# Patient Record
Sex: FEMALE | Race: WHITE | NOT HISPANIC OR LATINO | URBAN - METROPOLITAN AREA
[De-identification: names, ages, dates, MRNs, and addresses within clinical notes are randomized per-mention and may not be internally consistent; named-entity substitution may affect disease eponyms.]

---

## 2019-02-08 ENCOUNTER — HOSPITAL ENCOUNTER (OUTPATIENT)
Dept: URGENT CARE | Facility: CLINIC | Age: 24
Discharge: HOME | End: 2019-02-08
Attending: PHYSICIAN ASSISTANT

## 2019-02-10 LAB — BACTERIA SPEC AEROBE CULT: ABNORMAL

## 2019-04-25 ENCOUNTER — HOSPITAL ENCOUNTER (OUTPATIENT)
Dept: URGENT CARE | Facility: CLINIC | Age: 24
Discharge: HOME | End: 2019-04-25

## 2019-04-27 LAB — BACTERIA SPEC AEROBE CULT: NORMAL

## 2019-11-14 ENCOUNTER — HOSPITAL ENCOUNTER (OUTPATIENT)
Dept: URGENT CARE | Facility: CLINIC | Age: 24
Discharge: HOME | End: 2019-11-14

## 2020-04-05 ENCOUNTER — HOSPITAL ENCOUNTER (OUTPATIENT)
Dept: URGENT CARE | Facility: CLINIC | Age: 25
Discharge: HOME | End: 2020-04-05

## 2020-04-07 LAB — BACTERIA SPEC AEROBE CULT: NORMAL

## 2020-06-15 ENCOUNTER — HOSPITAL ENCOUNTER (OUTPATIENT)
Dept: URGENT CARE | Facility: CLINIC | Age: 25
Discharge: HOME | End: 2020-06-15

## 2020-07-06 ENCOUNTER — OFFICE VISIT CONVERTED (OUTPATIENT)
Dept: PLASTIC SURGERY | Facility: CLINIC | Age: 25
End: 2020-07-06
Attending: PLASTIC SURGERY

## 2020-08-11 ENCOUNTER — HOSPITAL ENCOUNTER (OUTPATIENT)
Dept: MAMMOGRAPHY | Facility: HOSPITAL | Age: 25
Discharge: HOME | End: 2020-08-11
Attending: PLASTIC SURGERY

## 2020-08-11 LAB — NICOTINE UR-MCNC: NEGATIVE NG/ML

## 2020-08-24 ENCOUNTER — OFFICE VISIT CONVERTED (OUTPATIENT)
Dept: PLASTIC SURGERY | Facility: CLINIC | Age: 25
End: 2020-08-24
Attending: PLASTIC SURGERY

## 2020-10-02 ENCOUNTER — HOSPITAL ENCOUNTER (OUTPATIENT)
Dept: URGENT CARE | Facility: CLINIC | Age: 25
Discharge: HOME | End: 2020-10-02

## 2020-10-15 ENCOUNTER — HOSPITAL ENCOUNTER (OUTPATIENT)
Dept: PREADMISSION TESTING | Facility: HOSPITAL | Age: 25
Discharge: HOME | End: 2020-10-15
Attending: PLASTIC SURGERY

## 2020-10-17 LAB — SARS-COV-2 RNA SPEC QL NAA+PROBE: NOT DETECTED

## 2020-10-20 ENCOUNTER — HOSPITAL ENCOUNTER (OUTPATIENT)
Dept: PERIOP | Facility: HOSPITAL | Age: 25
Setting detail: HOSPITAL OUTPATIENT SURGERY
Discharge: HOME | End: 2020-10-20
Attending: PLASTIC SURGERY

## 2020-10-20 LAB
HCG UR QL: NEGATIVE
NICOTINE UR-MCNC: NEGATIVE NG/ML

## 2020-10-23 ENCOUNTER — OFFICE VISIT CONVERTED (OUTPATIENT)
Dept: PLASTIC SURGERY | Facility: CLINIC | Age: 25
End: 2020-10-23
Attending: PLASTIC SURGERY

## 2020-10-30 ENCOUNTER — OFFICE VISIT CONVERTED (OUTPATIENT)
Dept: PLASTIC SURGERY | Facility: CLINIC | Age: 25
End: 2020-10-30
Attending: PLASTIC SURGERY

## 2020-10-30 ENCOUNTER — CONVERSION ENCOUNTER (OUTPATIENT)
Dept: PLASTIC SURGERY | Facility: CLINIC | Age: 25
End: 2020-10-30

## 2020-11-13 ENCOUNTER — CONVERSION ENCOUNTER (OUTPATIENT)
Dept: PLASTIC SURGERY | Facility: CLINIC | Age: 25
End: 2020-11-13

## 2020-11-13 ENCOUNTER — OFFICE VISIT CONVERTED (OUTPATIENT)
Dept: PLASTIC SURGERY | Facility: CLINIC | Age: 25
End: 2020-11-13
Attending: PLASTIC SURGERY

## 2020-11-23 ENCOUNTER — CONVERSION ENCOUNTER (OUTPATIENT)
Dept: PLASTIC SURGERY | Facility: CLINIC | Age: 25
End: 2020-11-23

## 2020-11-23 ENCOUNTER — OFFICE VISIT CONVERTED (OUTPATIENT)
Dept: PLASTIC SURGERY | Facility: CLINIC | Age: 25
End: 2020-11-23
Attending: NURSE PRACTITIONER

## 2021-01-14 ENCOUNTER — OFFICE VISIT CONVERTED (OUTPATIENT)
Dept: PLASTIC SURGERY | Facility: CLINIC | Age: 26
End: 2021-01-14
Attending: PLASTIC SURGERY

## 2021-01-14 ENCOUNTER — CONVERSION ENCOUNTER (OUTPATIENT)
Dept: PLASTIC SURGERY | Facility: CLINIC | Age: 26
End: 2021-01-14

## 2021-04-22 ENCOUNTER — HOSPITAL ENCOUNTER (OUTPATIENT)
Dept: MAMMOGRAPHY | Facility: HOSPITAL | Age: 26
Discharge: HOME | End: 2021-04-22
Attending: PLASTIC SURGERY

## 2021-05-07 ENCOUNTER — OFFICE VISIT CONVERTED (OUTPATIENT)
Dept: PLASTIC SURGERY | Facility: CLINIC | Age: 26
End: 2021-05-07
Attending: NURSE PRACTITIONER

## 2021-05-10 NOTE — H&P
"   History and Physical      Patient Name: Raissa Bertrand   Patient ID: [id number]   Sex: Female   YOB: 1995        Visit Date: July 6, 2020    Provider: Helen Frances MD   Location: [facility]   [facility]  [place], [place]  [id number]   Location Phone: [phone]          Chief Complaint  · \"My breasts are too big\"  · \"I'm having shoulder and back pain\"  · \"My bra straps are cutting into my shoulders\"  · \"I have rashes under my breasts\"  · \"I can't exercise because of my breasts\"      History Of Present Illness  Raissa Bertrand is a 24 year old /White female who presents to the office today as a consult from Lyndsey DAVIS.   Raissa Bertrand is a 24 year old /White female who presents to the office today as a consult from Lyndsey DAVIS and would like to discuss breast reduction. Current bra size 38 Triple D , would like to be a full C cup/small D cup. No personal history and/or no positive family (who) have a history of breast cancer. Neck, shoulders, and upper back pain present for ten years. Conservative treatments of ibuprofen and massage , with little or temporary relief. Experiences rashes, treats with baby powder. Can't sleep on back, can't breathe. Trouble exercising , cannot do ACTIVITIES that she would like to do, generally has to wear many sports bras      Patient wears multiple sports bras when exercising. Patient works at [employer], and her breast size interferes with work. Patient does have one child, and has a history of breastfeeding. Patient does not plan to have anymore children. Patient would like her breasts to be smaller and more proportionate. Patient smokes 1/2 PPD.       Past Medical History  Anemia; Macromastia         Past Surgical History  *Denies any surgical procedures         Medication List  Mirena 20 mcg/24 hours (5 yrs) 52 mg intrauterine intrauterine device " "        Allergy List  NO KNOWN DRUG ALLERGIES       Allergies Reconciled  Family Medical History  *No Known Family History         Social History  Tobacco (Current every day)         Review of Systems  · Cardiovascular  o Denies  o : chest pain, lower extremity edema, Heart Attack, Abnormal EKG  · Respiratory  o Denies  o : shortness of breath, wheezing, cough  · Neurologic  o Denies  o : muscular weakness, incoordination, tingling or numbness, headaches  · Psychiatric  o Denies  o : anxiety, depression, difficulty sleeping      Vitals  Date Time BP Position Site L\R Cuff Size HR RR TEMP (F) WT  HT  BMI kg/m2 BSA m2 O2 Sat        07/06/2020 09:37 /71 Sitting    73 - R  97.6 177lbs 6oz 5'  4.5\" 29.98 1.91 95 %          Physical Examination  · Constitutional  o Appearance  o : well developed, well-nourished, Alert and oriented X3  · Eyes  o Sclerae  o : sclerae white  · Respiratory  o Respiratory Effort  o : breathing unlabored   · Cardiovascular  o Heart  o : regular rate  · Breasts  o FEMALE BREAST EXAM  o :   § Masses  § : no masses  § Nipple Discharge  § : no nipple discharge  § Axillary Lymphadenopathy  § : no axillary lymphadenopathy  § SN-N (Right Breast)  § : 35  § SN-N (Left Breast)  § : 36  § SN-IMF (Right Breast)  § : 26  § SN-IMF (Left Breast)  § : 26  § N-IMF stretch (Right Breast)  § : 17  § N-IMF stretch (Left Breast)  § : 16  · Gastrointestinal  o Abdominal Examination  o : abdomen nontender to palpation  · Lymphatic  o Axilla  o : No lymphadenopathy present  · Skin and Subcutaneous Tissue  o General Inspection  o : no lesions present, no areas of discoloration, skin turgor normal, texture normal  · Psychiatric  o Judgement and Insight  o : judgment and insight intact  o Mood and Affect  o : mood normal, affect appropriate  · Schnur Scale  o Schnur Scale Score  o : 527  · BSA  o BSA  o : 1.91     redness and moisture under both breasts with chronic hyperpigmentation, left breast larger than " right breast, significant shoulder grooving bilaterally               Assessment  · Tobacco abuse     305.1/Z72.0  · Macromastia       Hypertrophy of breast     611.1/N62    Problems Reconciled  Plan  · Orders  o Smoking cessation counseling, 3-10 minutes [facility] (73661) - 305.1/Z72.0 - 07/06/2020  o Plastics reconstructive visit (PSREC) - - 07/06/2020  o Cotinine screen urine (done at [facility]) (33866) - 305.1/Z72.0 - 07/06/2020  o Mammogram breast screening 2D digital bilateral. (18723, ) - 611.1/N62 - 07/06/2020   Baseline mammogram prior to breast reduction surgery.   · Medications  o Medications have been Reconciled  o Transition of Care or Provider Policy  · Instructions  o Tobacco and smoking cessation counseling for more than 3 minutes was completed. I counseled the patient to stop smoking before surgery in order to avoid complications such as wound healing problems and infection. We discussed smoking cessation strategies. I counseled the patient to be nicotine free because the nicotine is what causes vasoconstriction of the blood vessels.  o The patient was given literature in regards to the procedure and encouraged to visit the websites of ASPS and ASAPS.  o We will have the patient undergo a pre-operative mammogram.  o Greater than 45 min of time was spent directly with the pt in counseling & coordination of care.  o We discussed the procedure for bilateral breast reduction under general anesthesia as well as the postoperative recovery time and the need for limitation of activities. The risks of surgery were discussed including bleeding, infection, scarring (for which diagrams were drawn), pain, poor healing, loss of skin and or nipple, change in nipple sensation, inability to breast feed, asymmetry, no guarantee of postoperative cup size.  o I think that this patient is an excellent candidate for a breast reduction. I feel that this procedure is medically necessary to relieve her symptoms. I would anticipate  resecting at least 527 grams of breast tissue from each breast. Photographs were taken. Instructed pt to call and schedule pre-op when Cotinine is negative. This patient has my office number to call with any further questions.  o Greater than 45 min of time was spent directly with the pt in counseling & coordination of care.  o Electronically Identified Patient Education Materials Provided Electronically            Electronically Signed by: Helen Frnaces MD -Author on July 6, 2020 02:34:45 PM

## 2021-05-13 NOTE — PROGRESS NOTES
Progress Note      Patient Name: Raissa Bertrand   Patient ID: [id number]   Sex: Female   YOB: 1995    Referring Provider: Lyndsey DAVIS    Visit Date: October 23, 2020    Provider: Helen Frances MD   Location: [facility]   Location Address: [address] [place] [address]  [id number]   Location Phone: [phone]          Chief Complaint  · Follow Up Visit, Breast Reduction      History Of Present Illness  Raissa Betrrand is a 24 year old /White female who presents to the office today as a consult from Lyndsey DAVIS.   Raissa Bertrand is a 24 year old /White female who presents to the office today for a follow up visit status post breast reduction on 10/20/2020. She is doing very well.   Pain Rating on the Martin's Scale is: 3/10       Past Medical History  Anemia; Macromastia         Past Surgical History  *Denies any surgical procedures         Medication List  Chantix 1 mg oral tablet; Diflucan 150 mg oral tablet; Mirena 20 mcg/24 hours (5 yrs) 52 mg intrauterine intrauterine device         Allergy List  NO KNOWN DRUG ALLERGIES         Family Medical History  *No Known Family History         Social History  Tobacco (Former)         Review of Systems  · Cardiovascular  o Denies  o : chest pain, lower extremity edema, Heart Attack, Abnormal EKG  · Respiratory  o Denies  o : shortness of breath, wheezing, cough  · Neurologic  o Denies  o : muscular weakness, incoordination, tingling or numbness, headaches  · Psychiatric  o Denies  o : anxiety, depression, difficulty sleeping      Vitals  Date Time BP Position Site L\R Cuff Size HR RR TEMP (F) WT  HT  BMI kg/m2 BSA m2 O2 Sat FR L/min FiO2        10/23/2020 09:37 /79 Sitting    105 - R  97.7     98 %  21%          Physical Examination  · Constitutional  o Appearance  o : well developed, well-nourished, Alert and oriented X3  · Respiratory  o Respiratory Effort  o :  breathing unlabored   · Cardiovascular  o Heart  o : regular rate  · Skin and Subcutaneous Tissue  o Surgical Incision  o : steri strips in place, breasts soft, good symmetry, expected ecchymosis and edema, nipples viable          Assessment  · Postoperative follow-up     V67.00/Z09      Plan  · Medications  o Medications have been Reconciled  o Transition of Care or Provider Policy  · Instructions  o Wean pain medication, encourage ambulation, no heavy lifting, OK to shower, continue surgical bra.  o Electronically Identified Patient Education Materials Provided Electronically            Electronically Signed by: Helen Frances MD -Author on October 26, 2020 09:38:27 AM

## 2021-05-13 NOTE — PROGRESS NOTES
Progress Note      Patient Name: Raissa Bertrand   Patient ID: [id number]   Sex: Female   YOB: 1995    Referring Provider: Lyndsey DAVIS    Visit Date: November 23, 2020    Provider: RYAN Gambino   Location: [facility]   Location Address: [address] [place] [address]  [id number]   Location Phone: [phone]          History Of Present Illness  Raissa Bertrand is a 24 year old /White female who presents to the office today as a consult from Lyndsey DAVIS.   Raissa Bertrand is a 24 year old /White female who presents to the office today for a follow up visit status post breast reduction on 10/20/2020. Patient comes in today for breast redness and itching. May be allergic to Aquaphor but has used in the past did fine. Has a raw a spot on left breast incision but superficial. Start Medrol pack that was called in and itching and redness has improved. No fever.   Pain Rating on the [place]'s Scale is: none       Past Medical History  Anemia; Macromastia         Past Surgical History  breast reduction         Medication List  ibuprofen 800 mg oral tablet; Medrol (Khalif) 4 mg oral tablets,dose pack; Mirena 20 mcg/24 hours (5 yrs) 52 mg intrauterine intrauterine device; Tylenol 325 mg oral capsule         Allergy List  NO KNOWN DRUG ALLERGIES         Family Medical History  *No Known Family History         Social History  Tobacco (Former)         Vitals  Date Time BP Position Site L\R Cuff Size HR RR TEMP (F) WT  HT  BMI kg/m2 BSA m2 O2 Sat FR L/min FiO2 HC       11/23/2020 03:24 /87 Sitting    85 - R  98     97 %  21%          Physical Examination  · Constitutional  o Appearance  o : well developed, well-nourished, Alert and oriented X3  · Respiratory  o Respiratory Effort  o : breathing unlabored   · Cardiovascular  o Heart  o : regular rate  · Skin and Subcutaneous Tissue  o Surgical Incision  o : breasts soft, good  symmetry, improving ecchymosis and edema, nipples viable, light pink rash around incision and between breast, superficial 0.5 cm raw spot left medial horizontal incision with small amount fibrous tissue.          Assessment  · Postoperative follow-up     V67.00/Z09      Plan  · Orders  o Plastics reconstructive visit (PSREC) - - 11/23/2020  · Medications  o Medications have been Reconciled  o Transition of Care or Provider Policy  · Instructions  o Pt states redness/itching has already improved greatly. Finish steroids and avoid heat to breast. May continue antihistamine. Pt would like to try aquaphor again after rash settles down. She doesn't think it was caused by Aquaphor but it may have been exacerbated by it, instructed she can try a small area in a week and if any itching/redness develops then avoid but may use Vitamin E or Vaseline for scar massage. Only use bacitracin ointment to open raw area over left breast. Call if any symptoms worsen or do not improve.            Electronically Signed by: RYAN Gambino -Author on November 23, 2020 09:11:44 PM

## 2021-05-13 NOTE — PROGRESS NOTES
Progress Note      Patient Name: Raissa Bertrand   Patient ID: [id number]   Sex: Female   YOB: 1995    Referring Provider: Lyndsey DAVIS    Visit Date: October 30, 2020    Provider: Helen Frances MD   Location: [facility]   Location Address: [address] [place] [address]  [id number]   Location Phone: [phone]          History Of Present Illness  Raissa Bertrand is a 24 year old /White female who presents to the office today as a consult from Lyndsey DAVIS.   Raissa Bertrand is a 24 year old /White female who presents to the office today for a follow up visit status post breast reduction on 10/20/2020. She is doing very well.   Pain Rating on the [place]'s Scale is: 7/10       Past Medical History  Anemia; Macromastia         Past Surgical History  breast reduction         Medication List  ibuprofen 800 mg oral tablet; Mirena 20 mcg/24 hours (5 yrs) 52 mg intrauterine intrauterine device; Tylenol 325 mg oral capsule         Allergy List  NO KNOWN DRUG ALLERGIES         Family Medical History  *No Known Family History         Social History  Tobacco (Former)         Vitals  Date Time BP Position Site L\R Cuff Size HR RR TEMP (F) WT  HT  BMI kg/m2 BSA m2 O2 Sat FR L/min FiO2 HC       10/30/2020 12:04 /79 Sitting    79 - R  98.1     97 %  21%          Physical Examination  · Constitutional  o Appearance  o : well developed, well-nourished, Alert and oriented X3  · Respiratory  o Respiratory Effort  o : breathing unlabored   · Cardiovascular  o Heart  o : regular rate  · Skin and Subcutaneous Tissue  o Surgical Incision  o : steri strips in place, breasts soft, good symmetry, improving ecchymosis and edema, nipples viable          Assessment  · Postoperative follow-up     V67.00/Z09      Plan  · Medications  o Medications have been Reconciled  o Transition of Care or Provider Policy  · Instructions  o no heavy  lifting, OK to shower, continue surgical bra, rtc at one month            Electronically Signed by: Helen Frances MD -Author on November 2, 2020 09:04:30 AM

## 2021-05-13 NOTE — PROGRESS NOTES
Progress Note      Patient Name: Raissa Bertrand   Patient ID: [id number]   Sex: Female   YOB: 1995    Referring Provider: Lyndsey DAVIS    Visit Date: November 13, 2020    Provider: Helne Frances MD   Location: [facility]   Location Address: [address] [place] [address]  [id number]   Location Phone: [phone]          History Of Present Illness  Raissa Bertrand is a 24 year old /White female who presents to the office today as a consult from Lyndsey DAVIS.   Raissa Bertrand is a 24 year old /White female who presents to the office today for a follow up visit status post breast reduction on 10/20/2020. Patient states some tenderness. She is doing very well.   Pain Rating on the Martin's Scale is: none       Past Medical History  Anemia; Macromastia         Past Surgical History  breast reduction         Medication List  ibuprofen 800 mg oral tablet; Mirena 20 mcg/24 hours (5 yrs) 52 mg intrauterine intrauterine device; Tylenol 325 mg oral capsule         Allergy List  NO KNOWN DRUG ALLERGIES         Family Medical History  *No Known Family History         Social History  Tobacco (Former)         Vitals  Date Time BP Position Site L\R Cuff Size HR RR TEMP (F) WT  HT  BMI kg/m2 BSA m2 O2 Sat FR L/min FiO2 HC       11/13/2020 02:00 /67 Sitting    77 - R  98.1     98 %  21%          Physical Examination  · Constitutional  o Appearance  o : well developed, well-nourished, Alert and oriented X3  · Respiratory  o Respiratory Effort  o : breathing unlabored   · Cardiovascular  o Heart  o : regular rate  · Skin and Subcutaneous Tissue  o Surgical Incision  o : steri strips in place, breasts soft, good symmetry, improving ecchymosis and edema, nipples viable          Assessment  · Postoperative follow-up     V67.00/Z09      Plan  · Medications  o Medications have been Reconciled  o Transition of Care or Provider  Policy  · Instructions  o Follow Up 3 months   o Aquaphor and scar massage, ease into activity            Electronically Signed by: Helen Frances MD -Author on November 13, 2020 03:31:25 PM

## 2021-05-13 NOTE — PROGRESS NOTES
Progress Note      Patient Name: Raissa Bertrand   Patient ID: [id number]   Sex: Female   YOB: 1995        Visit Date: August 24, 2020    Provider: Helen Frances MD   Location: [facility]   [facility]  [place]  [id number]   Location Phone: [phone]          History Of Present Illness  Raissa Bertrand is a 24 year old /White female who presents to the office today as a consult from Lyndsey DAVIS.   Raissa Bertrand is a 24 year old /White female who presents to the office today as a consult from Lyndsey DAVIS and would like to discuss breast reduction. Current bra size 38 Triple D , would like to be a full C cup/small D cup. No personal history and/or no positive family (who) have a history of breast cancer. Neck, shoulders, and upper back pain present for ten years. Conservative treatments of ibuprofen and massage , with little or temporary relief. Experiences rashes, treats with baby powder. Can't sleep on back, can't breathe. Trouble exercising , cannot do ACTIVITIES that she would like to do, generally has to wear many sports bras      Patient wears multiple sports bras when exercising. Patient works at [employer], and her breast size interferes with work. Patient does have one child, and has a history of breastfeeding. Patient does not plan to have anymore children. Patient would like her breasts to be smaller and more proportionate. Patient quit smoking. She is here today to schedule. Mammogram reviewed and is normal.       Past Medical History  Anemia; Macromastia         Past Surgical History  *Denies any surgical procedures         Medication List  Chantix 1 mg oral tablet; Mirena 20 mcg/24 hours (5 yrs) 52 mg intrauterine intrauterine device         Allergy List  NO KNOWN DRUG ALLERGIES         Family Medical History  *No Known Family History         Social History  Tobacco (Former)          Vitals  Date Time BP Position Site L\R Cuff Size HR RR TEMP (F) WT  HT  BMI kg/m2 BSA m2 O2 Sat HC       08/24/2020 11:36 /77 Sitting    60 - R  97.3     97 %          Physical Examination  · Constitutional  o Appearance  o : well developed, well-nourished, Alert and oriented X3  · Eyes  o Sclerae  o : sclerae white  · Respiratory  o Respiratory Effort  o : breathing unlabored   · Cardiovascular  o Heart  o : regular rate  · Breasts  o FEMALE BREAST EXAM  o :   § Masses  § : no masses  § Nipple Discharge  § : no nipple discharge  § Axillary Lymphadenopathy  § : no axillary lymphadenopathy  § SN-N (Right Breast)  § : 35  § SN-N (Left Breast)  § : 36  § SN-IMF (Right Breast)  § : 26  § SN-IMF (Left Breast)  § : 26  § N-IMF stretch (Right Breast)  § : 17  § N-IMF stretch (Left Breast)  § : 16  · Gastrointestinal  o Abdominal Examination  o : abdomen nontender to palpation  · Lymphatic  o Axilla  o : No lymphadenopathy present  · Skin and Subcutaneous Tissue  o General Inspection  o : no lesions present, no areas of discoloration, skin turgor normal, texture normal  · Psychiatric  o Judgement and Insight  o : judgment and insight intact  o Mood and Affect  o : mood normal, affect appropriate  · Schnur Scale  o Schnur Scale Score  o : 527  · BSA  o BSA  o : 1.91     redness and moisture under both breasts with chronic hyperpigmentation, left breast larger than right breast, significant shoulder grooving bilaterally           Assessment  · Tobacco abuse     305.1/Z72.0  · Macromastia       Hypertrophy of breast     611.1/N62      Plan  · Orders  o Plastics reconstructive visit (PSREC) - - 08/24/2020  o Cotinine screen urine (done at [facility]) ([address]4870) - - 08/24/2020  o [facility] Pre-Op Covid-19 Screening (13331) - - 08/24/2020  · Medications  o Medications have been Reconciled  o Transition of Care or Provider Policy  · Instructions  o We discussed the procedure for bilateral breast reduction under general  anesthesia as well as the postoperative recovery time and the need for limitation of activities. The risks of surgery were discussed including bleeding, infection, scarring (for which diagrams were drawn), pain, poor healing, loss of skin and or nipple, change in nipple sensation, inability to breast feed, asymmetry, no guarantee of postoperative cup size.  o I think that this patient is an excellent candidate for a breast reduction. I feel that this procedure is medically necessary to relieve her symptoms. I would anticipate resecting at least 527 grams of breast tissue from each breast. Photographs were taken. Instructed pt to call and schedule pre-op when Cotinine is negative. This patient has my office number to call with any further questions.  o Greater than 45 min of time was spent directly with the pt in counseling & coordination of care.            Electronically Signed by: Helen Frances MD -Author on August 24, 2020 02:34:00 PM

## 2021-05-14 VITALS
HEART RATE: 60 BPM | OXYGEN SATURATION: 97 % | TEMPERATURE: 97.3 F | SYSTOLIC BLOOD PRESSURE: 109 MMHG | DIASTOLIC BLOOD PRESSURE: 77 MMHG

## 2021-05-14 VITALS
TEMPERATURE: 98 F | HEART RATE: 85 BPM | DIASTOLIC BLOOD PRESSURE: 87 MMHG | SYSTOLIC BLOOD PRESSURE: 117 MMHG | OXYGEN SATURATION: 97 %

## 2021-05-14 VITALS
DIASTOLIC BLOOD PRESSURE: 67 MMHG | HEART RATE: 77 BPM | TEMPERATURE: 98.1 F | OXYGEN SATURATION: 98 % | SYSTOLIC BLOOD PRESSURE: 112 MMHG

## 2021-05-14 VITALS
TEMPERATURE: 97.7 F | OXYGEN SATURATION: 98 % | HEART RATE: 105 BPM | DIASTOLIC BLOOD PRESSURE: 79 MMHG | SYSTOLIC BLOOD PRESSURE: 115 MMHG

## 2021-05-14 VITALS
OXYGEN SATURATION: 97 % | SYSTOLIC BLOOD PRESSURE: 112 MMHG | TEMPERATURE: 98.1 F | DIASTOLIC BLOOD PRESSURE: 79 MMHG | HEART RATE: 79 BPM

## 2021-05-14 VITALS
OXYGEN SATURATION: 96 % | DIASTOLIC BLOOD PRESSURE: 70 MMHG | SYSTOLIC BLOOD PRESSURE: 111 MMHG | HEART RATE: 68 BPM | TEMPERATURE: 97.9 F

## 2021-05-14 NOTE — PROGRESS NOTES
Progress Note      Patient Name: Raissa Bertrand   Patient ID: [id number]   Sex: Female   YOB: 1995    Referring Provider: Lyndsey DAVIS    Visit Date: January 14, 2021    Provider: Helen Frances MD   Location: [facility]   Location Address: [address] [place] [address]  [id number]   Location Phone: [phone]          History Of Present Illness  Raissa Bertrand is a 25 year old /White female who presents to the office today as a consult from Lyndsey DAVIS.   Raissa Bertrand is a 24 year old /White female who presents to the office today for a follow up visit status post breast reduction on 10/20/2020. Doing well. Right breast nipple is still inverted.   Pain Rating on the Martin's Scale is: none       Past Medical History  Anemia; Macromastia         Past Surgical History  breast reduction         Medication List  ibuprofen 800 mg oral tablet; Mirena 20 mcg/24 hours (5 yrs) 52 mg intrauterine intrauterine device; Tylenol 325 mg oral capsule         Allergy List  NO KNOWN DRUG ALLERGIES       Allergies Reconciled  Family Medical History  *No Known Family History         Social History  Tobacco (Former)         Vitals  Date Time BP Position Site L\R Cuff Size HR RR TEMP (F) WT  HT  BMI kg/m2 BSA m2 O2 Sat FR L/min FiO2 HC       01/14/2021 12:05 /70 Sitting    68 - R  97.9     96 %  21%          Physical Examination  · Constitutional  o Appearance  o : well developed, well-nourished, Alert and oriented X3  · Respiratory  o Respiratory Effort  o : breathing unlabored   · Cardiovascular  o Heart  o : regular rate  · Skin and Subcutaneous Tissue  o Surgical Incision  o : breasts soft, good symmetry, improving ecchymosis and edema, nipples viable, light pink rash around incision and between breast, superficial 0.5 cm raw spot left medial horizontal incision with small amount fibrous  tissue.          Assessment  · Postoperative follow-up     V67.00/Z09  · Macromastia       Hypertrophy of breast     611.1/N62      Plan  · Orders  o Mammogram breast screening 2D digital bilateral. (60443, ) - 611.1/N62 - 04/21/2021   S/p bilateral breast reduction 10/20/20. New baseline mammogram.   · Medications  o Medications have been Reconciled  o Transition of Care or Provider Policy  · Instructions  o Pt states redness/itching has already improved greatly. Finish steroids and avoid heat to breast. May continue antihistamine. Pt would like to try aquaphor again after rash settles down. She doesn't think it was caused by Aquaphor but it may have been exacerbated by it, instructed she can try a small area in a week and if any itching/redness develops then avoid but may use Vitamin E or Vaseline for scar massage. Only use bacitracin ointment to open raw area over left breast. Call if any symptoms worsen or do not improve.            Electronically Signed by: Helen Frances MD -Author on January 15, 2021 11:28:33 AM

## 2021-05-15 VITALS
HEIGHT: 64 IN | HEART RATE: 73 BPM | TEMPERATURE: 97.6 F | DIASTOLIC BLOOD PRESSURE: 71 MMHG | OXYGEN SATURATION: 95 % | WEIGHT: 177.37 LBS | BODY MASS INDEX: 30.28 KG/M2 | SYSTOLIC BLOOD PRESSURE: 105 MMHG

## 2021-06-05 NOTE — PROGRESS NOTES
Progress Note      Patient Name: Raissa Bertrand   Patient ID: [id number]   Sex: Female   YOB: 1995    Referring Provider: Lyndsey DAVIS    Visit Date: May 7, 2021    Provider: RYAN Gambino   Location: [facility]   Location Address: [address] [place] [address]  [id number]   Location Phone: [phone]          History Of Present Illness  Raissa Bertrand is a 25 year old /White female who presents to the office today as a consult from Lyndsey DAVIS.   Raissa Bertrand is a 24 year old /White female who presents to the office today for a follow up visit status post breast reduction on 10/20/2020. Doing well. Right breast nipple is still inverted. Was not inverted before surgery. Mammogram was normal.   Pain Rating on the Clarence's Scale is: none       Past Medical History  Anemia; Macromastia         Past Surgical History  breast reduction         Medication List  ibuprofen 800 mg oral tablet; Mirena 20 mcg/24 hours (5 yrs) 52 mg intrauterine intrauterine device; Tylenol 325 mg oral capsule         Allergy List  NO KNOWN DRUG ALLERGIES       Allergies Reconciled  Family Medical History  *No Known Family History         Social History  Tobacco (Former)         Vitals  Date Time BP Position Site L\R Cuff Size HR RR TEMP (F) WT  HT  BMI kg/m2 BSA m2 O2 Sat FR L/min FiO2 HC       05/07/2021 11:23 /79 Sitting    74 - R  97.6     97 %  21%          Physical Examination  · Constitutional  o Appearance  o : well developed, well-nourished, Alert and oriented X3  · Respiratory  o Respiratory Effort  o : breathing unlabored   · Cardiovascular  o Heart  o : regular rate  · Skin and Subcutaneous Tissue  o Surgical Incision  o : breasts soft, good symmetry, no edema, nipples viable but right nipple slightly inverted          Assessment  · Macromastia       Hypertrophy of breast     611.1/N62      Plan  · Orders  o Plastics  reconstructive visit ([facility]) - - 05/07/2021  · Medications  o Medications have been Reconciled  o Transition of Care or Provider Policy  · Instructions  o Mammogram reviewed. May resume routine mammogram schedule as recommended by PCP/GYN. Continue scar massage and will discuss nipple inversion repair at that time . RTC one year post-op.             Electronically Signed by: RYAN Gambino -Author on May 7, 2021 11:44:08 AM

## 2021-07-15 VITALS
SYSTOLIC BLOOD PRESSURE: 115 MMHG | DIASTOLIC BLOOD PRESSURE: 79 MMHG | OXYGEN SATURATION: 97 % | HEART RATE: 74 BPM | TEMPERATURE: 97.6 F

## 2021-09-16 ENCOUNTER — LAB (OUTPATIENT)
Dept: LAB | Facility: HOSPITAL | Age: 26
End: 2021-09-16

## 2021-09-16 ENCOUNTER — TRANSCRIBE ORDERS (OUTPATIENT)
Dept: LAB | Facility: HOSPITAL | Age: 26
End: 2021-09-16

## 2021-09-16 DIAGNOSIS — Z00.00 ROUTINE GENERAL MEDICAL EXAMINATION AT A HEALTH CARE FACILITY: Primary | ICD-10-CM

## 2021-09-30 ENCOUNTER — OFFICE VISIT (OUTPATIENT)
Dept: OTOLARYNGOLOGY | Facility: CLINIC | Age: 26
End: 2021-09-30

## 2021-09-30 VITALS — TEMPERATURE: 97.3 F | BODY MASS INDEX: 30.66 KG/M2 | HEIGHT: 64 IN | WEIGHT: 179.6 LBS

## 2021-09-30 DIAGNOSIS — J03.91 RECURRENT TONSILLITIS: Primary | ICD-10-CM

## 2021-09-30 PROCEDURE — 99203 OFFICE O/P NEW LOW 30 MIN: CPT | Performed by: NURSE PRACTITIONER

## 2021-09-30 RX ORDER — IBUPROFEN 800 MG/1
TABLET ORAL
COMMUNITY
End: 2022-08-18

## 2021-09-30 NOTE — PROGRESS NOTES
Patient Name: Raissa Bretrand   Visit Date: 2021   Patient ID: [id number]  Provider: RYAN Victoria    Sex: female  Location: [facility]   YOB: 1995  Location Address: [address] [place] [address], [address] [place] [address],?[id number]    Primary Care Provider Lyndsey Freeman APRN  Location Phone: [phone]    Referring Provider: RYAN Kothari        Chief Complaint  Sore Throat (tonsilitis)    Subjective   Raissa Bertrand is a 25 y.o. female who presents to [facility] today as a consult from RYAN Kothari for evaluation of recurrent tonsillitis and sore throat.  Patient states that 3-4 times a year she will have sore throat, swollen tonsils, fever and body aches.  She states that she rarely gets diagnosed with strep but more frequently tonsillitis.  She states that this has been going on since she was a small child.  She reports she was last sick approximately 2 weeks ago at which time she was started on Augmentin.  She reports that her symptoms improved on the antibiotic.      Current Outpatient Medications on File Prior to Visit   Medication Sig   • Acetaminophen (Tylenol) 325 MG capsule Tylenol 325 mg oral capsule take 1 capsule by oral route once   Active   • ibuprofen (ADVIL,MOTRIN) 800 MG tablet ibuprofen 800 mg oral tablet take 1  by oral route once   Active   • levonorgestrel (Mirena, 52 MG,) 20 MCG/24HR IUD    • omeprazole (priLOSEC) 40 MG capsule Take 40 mg by mouth Daily.   • amoxicillin-clavulanate (AUGMENTIN) 875-125 MG per tablet Take 1 tablet by mouth Every 12 (Twelve) Hours.     No current facility-administered medications on file prior to visit.        Social History     Tobacco Use   • Smoking status: Former Smoker     Packs/day: 1.00     Types: Cigarettes     Quit date:      Years since quittin.7   • Smokeless tobacco: Never Used   • Tobacco comment: 2020 - SMOKES APPROX 1/2 PPD. HAS  "BEEN SMOKING FOR 8 YEARS   Vaping Use   • Vaping Use: Never used   Substance Use Topics   • Alcohol use: Not Currently   • Drug use: Never       Objective     Vital Signs:   Temp 97.3 °F (36.3 °C) (Temporal)   Ht 162.6 cm (64\")   Wt 81.5 kg (179 lb 9.6 oz)   BMI 30.83 kg/m²       Physical Exam  Constitutional:       General: She is not in acute distress.     Appearance: Normal appearance. She is not ill-appearing.   HENT:      Head: Normocephalic and atraumatic.      Jaw: There is normal jaw occlusion. No tenderness or pain on movement.      Salivary Glands: Right salivary gland is not diffusely enlarged or tender. Left salivary gland is not diffusely enlarged or tender.      Right Ear: Tympanic membrane, ear canal and external ear normal.      Left Ear: Tympanic membrane, ear canal and external ear normal.      Nose: Nose normal. No septal deviation.      Right Sinus: No maxillary sinus tenderness or frontal sinus tenderness.      Left Sinus: No maxillary sinus tenderness or frontal sinus tenderness.      Mouth/Throat:      Lips: Pink. No lesions.      Mouth: Mucous membranes are moist. No oral lesions.      Dentition: Normal dentition.      Tongue: No lesions.      Palate: No mass and lesions.      Pharynx: Oropharynx is clear. Uvula midline.      Tonsils: No tonsillar exudate. 3+ on the right. 3+ on the left.   Eyes:      Extraocular Movements: Extraocular movements intact.      Conjunctiva/sclera: Conjunctivae normal.      Pupils: Pupils are equal, round, and reactive to light.   Neck:      Thyroid: No thyroid mass, thyromegaly or thyroid tenderness.      Trachea: Trachea normal.   Pulmonary:      Effort: Pulmonary effort is normal. No respiratory distress.   Musculoskeletal:         General: Normal range of motion.      Cervical back: Normal range of motion and neck supple. No tenderness.   Lymphadenopathy:      Cervical: No cervical adenopathy.   Skin:     General: Skin is warm and dry.   Neurological:      " General: No focal deficit present.      Mental Status: She is alert and oriented to person, place, and time.      Cranial Nerves: No cranial nerve deficit.      Motor: No weakness.      Gait: Gait normal.   Psychiatric:         Mood and Affect: Mood normal.         Behavior: Behavior normal.         Thought Content: Thought content normal.         Judgment: Judgment normal.               Result Review :              Assessment and Plan    Diagnoses and all orders for this visit:    1. Recurrent tonsillitis (Primary)  -     Case Request; Standing  -     Case Request    Other orders  -     Follow Anesthesia Guidelines / Protocol; Future  -     Obtain Informed Consent; Future  -     Follow Anesthesia Guidelines / Protocol; Standing  -     NPO Diet; Standing    Based on her number of infections she is a good candidate for tonsillectomy and adenoidectomy.  Risk and benefits of tonsillectomy and adenoidectomy as well as complications and alternatives were discussed with the patient.  She met with Dr. Iisdro who also examined her.  She would like to proceed with getting this scheduled.  I will plan to see her back 6 weeks after the procedure.    I have discussed my findings with Dr Isidro, who has personally examined the patient and agrees with my assessment and plan.   Follow Up   No follow-ups on file.  Patient was given instructions and counseling regarding her condition or for health maintenance advice. Please see specific information pulled into the AVS if appropriate.      RYAN Victoria

## 2021-10-12 ENCOUNTER — TELEPHONE (OUTPATIENT)
Dept: OTOLARYNGOLOGY | Facility: CLINIC | Age: 26
End: 2021-10-12

## 2021-10-13 PROBLEM — J03.91 RECURRENT TONSILLITIS: Status: ACTIVE | Noted: 2021-10-13

## 2021-12-02 PROCEDURE — 87635 SARS-COV-2 COVID-19 AMP PRB: CPT | Performed by: PHYSICIAN ASSISTANT

## 2022-01-11 ENCOUNTER — TELEPHONE (OUTPATIENT)
Dept: OTOLARYNGOLOGY | Facility: CLINIC | Age: 27
End: 2022-01-11

## 2022-01-11 NOTE — TELEPHONE ENCOUNTER
Patient cancelled surgery did not want to reschedule at this time. I let patient know to call back when ready to proceed.

## 2022-07-21 ENCOUNTER — PROCEDURE VISIT (OUTPATIENT)
Dept: OBSTETRICS AND GYNECOLOGY | Facility: CLINIC | Age: 27
End: 2022-07-21

## 2022-07-21 VITALS
WEIGHT: 187 LBS | DIASTOLIC BLOOD PRESSURE: 79 MMHG | BODY MASS INDEX: 32.1 KG/M2 | HEART RATE: 71 BPM | SYSTOLIC BLOOD PRESSURE: 118 MMHG

## 2022-07-21 DIAGNOSIS — Z30.433 ENCOUNTER FOR REMOVAL AND REINSERTION OF INTRAUTERINE CONTRACEPTIVE DEVICE (IUD): Primary | ICD-10-CM

## 2022-07-21 PROCEDURE — 58300 INSERT INTRAUTERINE DEVICE: CPT | Performed by: OBSTETRICS & GYNECOLOGY

## 2022-07-21 PROCEDURE — 58301 REMOVE INTRAUTERINE DEVICE: CPT | Performed by: OBSTETRICS & GYNECOLOGY

## 2022-07-21 NOTE — PROGRESS NOTES
IUD Placement    Sex in last 2 weeks:  N/a, current mirena not   [facility]: n/a  Consent signed: Yes    CC: Presents for IUD placement    Procedure was explained in detail.  She understands the potential risks include, but are not limited to, failure (pregnancy), pain, bleeding, uterine perforation, and infection.  Her questions have been answered.      Subjective:  Here for mirena removal and insertion of new mirena. No c/o voiced.    Objective:  /79   Pulse 71   Wt 84.8 kg (187 lb)   BMI 32.10 kg/m²   General- NAD, alert and oriented, appropriate  Psych- Normal mood, good memory  Abdomen- Soft, non distended, non tender, no masses  External genitalia- Normal, no lesions  Vagina- Normal, no discharge  Uterus- Normal size, shape & consistency.  Non tender, mobile, & no prolapse  Cvx- Normal without lesion or discharge. IUD REMOVAL: Strings visualized, IUD removed intact.  Discarded.    Betadine x3.  Tenaculum placed.  Uterus sounded to 8cm.    Mirena IUD placed without difficulty.    Strings cut 2cm.    Patient tolerated well.      Assessment and Plan:  Diagnoses and all orders for this visit:    1. Encounter for removal and reinsertion of intrauterine contraceptive device (IUD) (Primary)  -     Levonorgestrel (MIRENA) 20 MCG/DAY IUD intrauterine device 1 each          Counseling:  She was counseled on the use of the IUD.  It provides contraception for 5 years (Mirena/Kyleena/Liletta) or 3 years (Kati) or 10 years (Copper).  Failure is <1%.  It does not protect her from STDs and condoms are recommended.  She has been instructed to check the strings monthly.     PRECAUTIONS-- If she has any fevers >101.5F, and abdominal/pelvic pain, or bleeding > 1pad/2hours, she needs to call our office or on call/ER (after hours/weekend).  She understands the potential risk of pelvic inflammatory disease and the potential for an effect on her future fertility if she does not seek immediate evaluation.  Cramping due to  the IUD should be controlled with a OTC reliever/NSAID.  If not, she should call our office.  If she misses a period and has a positive pregnancy test she must immediately seek medical attention due to the risk of ectopic pregnancy which can be life threatening.  She understands removal can sometimes be difficult and can require surgery.    Follow Up:  Return in about 4 weeks (around 8/18/2022) for post iud check.      Vera Thompson, RYAN  07/21/2022

## 2023-10-06 ENCOUNTER — OFFICE VISIT (OUTPATIENT)
Dept: OBSTETRICS AND GYNECOLOGY | Facility: CLINIC | Age: 28
End: 2023-10-06
Payer: COMMERCIAL

## 2023-10-06 VITALS
HEIGHT: 64 IN | DIASTOLIC BLOOD PRESSURE: 88 MMHG | WEIGHT: 200 LBS | HEART RATE: 83 BPM | BODY MASS INDEX: 34.15 KG/M2 | SYSTOLIC BLOOD PRESSURE: 131 MMHG

## 2023-10-06 DIAGNOSIS — R10.2 PELVIC PAIN: Primary | ICD-10-CM

## 2023-10-06 LAB
ALBUMIN SERPL-MCNC: 4.6 G/DL (ref 3.5–5.2)
ALBUMIN/GLOB SERPL: 1.6 G/DL
ALP SERPL-CCNC: 77 U/L (ref 39–117)
ALT SERPL W P-5'-P-CCNC: 24 U/L (ref 1–33)
ANION GAP SERPL CALCULATED.3IONS-SCNC: 10 MMOL/L (ref 5–15)
AST SERPL-CCNC: 22 U/L (ref 1–32)
BILIRUB SERPL-MCNC: 0.6 MG/DL (ref 0–1.2)
BUN SERPL-MCNC: 9 MG/DL (ref 6–20)
BUN/CREAT SERPL: 11.5 (ref 7–25)
CALCIUM SPEC-SCNC: 9.5 MG/DL (ref 8.6–10.5)
CHLORIDE SERPL-SCNC: 106 MMOL/L (ref 98–107)
CO2 SERPL-SCNC: 24 MMOL/L (ref 22–29)
CREAT SERPL-MCNC: 0.78 MG/DL (ref 0.57–1)
DEPRECATED RDW RBC AUTO: 39.7 FL (ref 37–54)
EGFRCR SERPLBLD CKD-EPI 2021: 106.3 ML/MIN/1.73
ERYTHROCYTE [DISTWIDTH] IN BLOOD BY AUTOMATED COUNT: 12 % (ref 12.3–15.4)
GLOBULIN UR ELPH-MCNC: 2.8 GM/DL
GLUCOSE SERPL-MCNC: 87 MG/DL (ref 65–99)
HBA1C MFR BLD: 5.4 % (ref 4.8–5.6)
HCT VFR BLD AUTO: 42.9 % (ref 34–46.6)
HGB BLD-MCNC: 14.8 G/DL (ref 12–15.9)
MCH RBC QN AUTO: 31.6 PG (ref 26.6–33)
MCHC RBC AUTO-ENTMCNC: 34.5 G/DL (ref 31.5–35.7)
MCV RBC AUTO: 91.5 FL (ref 79–97)
PLATELET # BLD AUTO: 244 10*3/MM3 (ref 140–450)
PMV BLD AUTO: 11.3 FL (ref 6–12)
POTASSIUM SERPL-SCNC: 4.4 MMOL/L (ref 3.5–5.2)
PROT SERPL-MCNC: 7.4 G/DL (ref 6–8.5)
RBC # BLD AUTO: 4.69 10*6/MM3 (ref 3.77–5.28)
SODIUM SERPL-SCNC: 140 MMOL/L (ref 136–145)
T4 FREE SERPL-MCNC: 1 NG/DL (ref 0.93–1.7)
TSH SERPL DL<=0.05 MIU/L-ACNC: 1.62 UIU/ML (ref 0.27–4.2)
WBC NRBC COR # BLD: 8.62 10*3/MM3 (ref 3.4–10.8)

## 2023-10-06 PROCEDURE — 80053 COMPREHEN METABOLIC PANEL: CPT | Performed by: NURSE PRACTITIONER

## 2023-10-06 PROCEDURE — 84443 ASSAY THYROID STIM HORMONE: CPT | Performed by: NURSE PRACTITIONER

## 2023-10-06 PROCEDURE — 85027 COMPLETE CBC AUTOMATED: CPT | Performed by: NURSE PRACTITIONER

## 2023-10-06 PROCEDURE — 83036 HEMOGLOBIN GLYCOSYLATED A1C: CPT | Performed by: NURSE PRACTITIONER

## 2023-10-06 PROCEDURE — 84439 ASSAY OF FREE THYROXINE: CPT | Performed by: NURSE PRACTITIONER

## 2023-10-06 NOTE — PROGRESS NOTES
"GYN Visit    CC:   Chief Complaint   Patient presents with    Pelvic Pain     Painful intercourse         HPI:   27 y.o. Contraception or HRT: Contraception:  Mirena IUD    Has been having ovarian pain, primarily on the right side.  It is super sharp and will occasionally radiate down her leg.  Sex is painful at times, particularly in certain positions.     Pain is intermittant, will happen several times a month.  Ibuprofen and movement will help.  Usually lasts about an hour.     No cycle with Mirena.  Denies any new partners, has been with current partner for about two years.   Will also grow chin hairs and is concerned this is hormonal in nature.    History: PMHx, Meds, Allergies, PSHx, Social Hx, and POBHx all reviewed and updated.    Review of Systems   Constitutional: Negative.    Genitourinary:  Positive for dyspareunia and pelvic pain.     PHYSICAL EXAM:  /88   Pulse 83   Ht 162.6 cm (64\")   Wt 90.7 kg (200 lb)   BMI 34.33 kg/m²      Physical Exam  Vitals and nursing note reviewed. Exam conducted with a chaperone present.   Constitutional:       Appearance: Normal appearance. She is well-developed and well-groomed.   HENT:      Head: Normocephalic.   Eyes:      Pupils: Pupils are equal, round, and reactive to light.   Genitourinary:     General: Normal vulva.      Exam position: Lithotomy position.      Pubic Area: No rash or pubic lice.       Labia:         Right: No rash, tenderness, lesion or injury.         Left: No rash, tenderness, lesion or injury.       Vagina: Normal. No foreign body. No vaginal discharge, erythema, tenderness, bleeding or lesions.      Cervix: No cervical motion tenderness or discharge.         Skin:     General: Skin is warm and dry.   Neurological:      General: No focal deficit present.      Mental Status: She is alert.       ASSESSMENT AND PLAN:  Diagnoses and all orders for this visit:    1. Pelvic pain (Primary)  Assessment & Plan:  Unsure as to cause of " patient's pain.  Will check labs and ultrasound.      Orders:  -     NuSwab VG+ - Swab, Cervix  -     T4, Free  -     TSH  -     CBC (No Diff)  -     Comprehensive Metabolic Panel  -     Hemoglobin A1c  -     US Non-ob Transvaginal; Future  -     17-Hydroxyprogesterone        Counseling:  Will follow with resutls    Follow Up:  Return for Follow up after ultrasound.      Galileo Arias, APRN  10/06/2023    [facility] [address] [facility]  [facility]  [address] [address]  Dept: [phone]  Dept Fax: [phone]  Loc: [phone]

## 2023-10-09 LAB
A VAGINAE DNA VAG QL NAA+PROBE: NORMAL SCORE
BVAB2 DNA VAG QL NAA+PROBE: NORMAL SCORE
C ALBICANS DNA VAG QL NAA+PROBE: NEGATIVE
C GLABRATA DNA VAG QL NAA+PROBE: NEGATIVE
C TRACH DNA VAG QL NAA+PROBE: NEGATIVE
MEGA1 DNA VAG QL NAA+PROBE: NORMAL SCORE
N GONORRHOEA DNA VAG QL NAA+PROBE: NEGATIVE
T VAGINALIS DNA VAG QL NAA+PROBE: NEGATIVE

## 2023-10-10 LAB — 17OHP SERPL-MCNC: 46 NG/DL

## 2023-10-18 ENCOUNTER — TELEPHONE (OUTPATIENT)
Dept: OBSTETRICS AND GYNECOLOGY | Facility: CLINIC | Age: 28
End: 2023-10-18
Payer: COMMERCIAL

## 2023-10-18 ENCOUNTER — HOSPITAL ENCOUNTER (OUTPATIENT)
Dept: ULTRASOUND IMAGING | Facility: HOSPITAL | Age: 28
Discharge: HOME | End: 2023-10-18
Admitting: NURSE PRACTITIONER
Payer: COMMERCIAL

## 2023-10-18 DIAGNOSIS — R10.2 PELVIC PAIN: ICD-10-CM

## 2023-10-18 PROCEDURE — 76830 TRANSVAGINAL US NON-OB: CPT

## 2023-10-18 NOTE — TELEPHONE ENCOUNTER
Informed patient that her ultrasound is normal and her IUD is in the expected position and it's recommended that  she keep her follow up appointment to further discuss her symptoms.

## 2023-10-18 NOTE — TELEPHONE ENCOUNTER
----- Message from RYAN Rai sent at 10/18/2023  2:53 PM EDT -----  Please let patient know her ultrasound is normal and her IUD is in the expected position.  Recommend she keep her follow up appointment to further discuss her symptoms.

## 2023-10-31 ENCOUNTER — TELEPHONE (OUTPATIENT)
Dept: OBSTETRICS AND GYNECOLOGY | Facility: CLINIC | Age: 28
End: 2023-10-31
Payer: COMMERCIAL

## 2023-10-31 NOTE — TELEPHONE ENCOUNTER
PT WAS SEEN AT ER LAST NIGHT FOR ABDOMINAL PAIN- PT STATES SHE HAS A LARGE HEMORRHAGIC CYST THAT IS 2X THE SIZE IT WAS AT THE LAST SCAN TWO WEEKS AGO - OVARY ALSO TWICE THE SIZE IT WAS - NO APPTS AVAILABLE WITH RODRIGO DAVIS UNTIL 11/14/2023- PLEASE ADVISE PT [phone] AT ANYTIME, FINE WITH LVM. PT STATES ER ADVISED SHE BE SEEN ASAP

## 2023-10-31 NOTE — TELEPHONE ENCOUNTER
Spoke with patient regarding concerns. I was able to schedule patient a sooner appointment. Advised Patient if she has any Heavy bleeding or non tolerable pain to go to ER for evaluation.

## 2023-11-06 ENCOUNTER — OFFICE VISIT (OUTPATIENT)
Dept: OBSTETRICS AND GYNECOLOGY | Facility: CLINIC | Age: 28
End: 2023-11-06
Payer: COMMERCIAL

## 2023-11-06 VITALS
WEIGHT: 198 LBS | HEART RATE: 65 BPM | HEIGHT: 64 IN | SYSTOLIC BLOOD PRESSURE: 119 MMHG | BODY MASS INDEX: 33.8 KG/M2 | DIASTOLIC BLOOD PRESSURE: 80 MMHG

## 2023-11-06 DIAGNOSIS — R10.2 PELVIC PAIN: Primary | ICD-10-CM

## 2023-11-06 PROCEDURE — 1159F MED LIST DOCD IN RCRD: CPT | Performed by: NURSE PRACTITIONER

## 2023-11-06 PROCEDURE — 99213 OFFICE O/P EST LOW 20 MIN: CPT | Performed by: NURSE PRACTITIONER

## 2023-11-06 PROCEDURE — 1160F RVW MEDS BY RX/DR IN RCRD: CPT | Performed by: NURSE PRACTITIONER

## 2023-11-06 NOTE — ASSESSMENT & PLAN NOTE
Evaluated in ED on 10/30/23 and 11/1/23 at [facility] for pelvic pain.  IUD in good position, 3.7 cm right ovarian cyst seen likely hemorrhagic.  Pain is better today.  Will plan follow up ultrasound in 8-12 weeks for resolution.

## 2023-11-06 NOTE — PROGRESS NOTES
"GYN Visit    CC:   Chief Complaint   Patient presents with    Pelvic Pain     Follow/up review US findings       HPI:   27 y.o. Contraception or HRT: Contraception:  Mirena IUD    Was seen at [facility] for pelvic pain/pressure brought on by intercourse.  Describes a stabbing pain.  Tenderness continued for the next several days but is better today. Has not had intercourse again.    US showed right ovarian cyst, 3.7 cm, probably hemorrhagic.  IUD in good position.     US pelvis transvaginal non-ob (2023 03:39)  History: PMHx, Meds, Allergies, PSHx, Social Hx, and POBHx all reviewed and updated.    Review of Systems   Constitutional: Negative.    Genitourinary:  Positive for pelvic pain.       PHYSICAL EXAM:  /80 (BP Location: Right arm, Patient Position: Sitting, Cuff Size: Adult)   Pulse 65   Ht 162.6 cm (64.02\")   Wt 89.8 kg (198 lb)   BMI 33.97 kg/m²      Physical Exam  Vitals and nursing note reviewed.   Constitutional:       Appearance: Normal appearance. She is well-developed and well-groomed.   Neurological:      Mental Status: She is alert.   Psychiatric:         Attention and Perception: Attention and perception normal.         Mood and Affect: Affect normal.         Speech: Speech normal.         Behavior: Behavior is cooperative.         Cognition and Memory: Cognition normal.         ASSESSMENT AND PLAN:  Diagnoses and all orders for this visit:    1. Pelvic pain (Primary)  Assessment & Plan:  Evaluated in ED on 10/30/23 and 23 at [facility] for pelvic pain.  IUD in good position, 3.7 cm right ovarian cyst seen likely hemorrhagic.  Pain is better today.  Will plan follow up ultrasound in 8-12 weeks for resolution.    Orders:  -     US Non-ob Transvaginal; Future        Counseling:  Will plan follow up ultrasound, call with any concerns    Follow Up:  Return for Follow up after ultrasound as needed. .        Galileo Arias, APRN  2023    [facility] " [facility]  [address] [place] [address] [place] [address]  Dept: [phone]  Dept Fax: [phone]  Loc: [phone]

## 2024-06-28 ENCOUNTER — TRANSCRIBE ORDERS (OUTPATIENT)
Dept: ADMINISTRATIVE | Facility: HOSPITAL | Age: 29
End: 2024-06-28
Payer: COMMERCIAL

## 2024-06-28 ENCOUNTER — HOSPITAL ENCOUNTER (OUTPATIENT)
Dept: GENERAL RADIOLOGY | Facility: HOSPITAL | Age: 29
Discharge: HOME | End: 2024-06-28
Payer: COMMERCIAL

## 2024-06-28 DIAGNOSIS — M54.50 LOW BACK PAIN, UNSPECIFIED BACK PAIN LATERALITY, UNSPECIFIED CHRONICITY, UNSPECIFIED WHETHER SCIATICA PRESENT: ICD-10-CM

## 2024-06-28 DIAGNOSIS — M54.50 LOW BACK PAIN, UNSPECIFIED BACK PAIN LATERALITY, UNSPECIFIED CHRONICITY, UNSPECIFIED WHETHER SCIATICA PRESENT: Primary | ICD-10-CM

## 2024-06-28 PROCEDURE — 72100 X-RAY EXAM L-S SPINE 2/3 VWS: CPT

## 2025-06-15 PROCEDURE — 87086 URINE CULTURE/COLONY COUNT: CPT
